# Patient Record
Sex: FEMALE | Race: WHITE | NOT HISPANIC OR LATINO | Employment: FULL TIME | URBAN - METROPOLITAN AREA
[De-identification: names, ages, dates, MRNs, and addresses within clinical notes are randomized per-mention and may not be internally consistent; named-entity substitution may affect disease eponyms.]

---

## 2017-04-08 ENCOUNTER — COMMUNICATION - HEALTHEAST (OUTPATIENT)
Dept: FAMILY MEDICINE | Facility: CLINIC | Age: 42
End: 2017-04-08

## 2017-04-08 DIAGNOSIS — G43.909 MIGRAINE: ICD-10-CM

## 2017-05-16 ENCOUNTER — ALLIED HEALTH/NURSE VISIT (OUTPATIENT)
Dept: LAB | Facility: CLINIC | Age: 42
End: 2017-05-16

## 2017-05-16 DIAGNOSIS — Z11.1 SCREENING EXAMINATION FOR PULMONARY TUBERCULOSIS: Primary | ICD-10-CM

## 2017-05-16 PROCEDURE — 36415 COLL VENOUS BLD VENIPUNCTURE: CPT | Performed by: FAMILY MEDICINE

## 2017-05-16 PROCEDURE — 86480 TB TEST CELL IMMUN MEASURE: CPT | Performed by: FAMILY MEDICINE

## 2017-05-16 NOTE — MR AVS SNAPSHOT
"              After Visit Summary   2017    Darlyn Parson    MRN: [id number]           Patient Information     Date Of Birth          1975        Visit Information        Provider Department      2017 8:45 AM  [facility]        Today's Diagnoses     Screening examination for pulmonary tuberculosis    -  1       Follow-ups after your visit        Who to contact     If you have questions or need follow up information about today's clinic visit or your schedule please contact [facility] directly at [phone].  Normal or non-critical lab and imaging results will be communicated to you by MyChart, letter or phone within 4 business days after the clinic has received the results. If you do not hear from us within 7 days, please contact the clinic through Tupalohart or phone. If you have a critical or abnormal lab result, we will notify you by phone as soon as possible.  Submit refill requests through HihoCoder or call your pharmacy and they will forward the refill request to us. Please allow 3 business days for your refill to be completed.          Additional Information About Your Visit        MyChart Information     HihoCoder lets you send messages to your doctor, view your test results, renew your prescriptions, schedule appointments and more. To sign up, go to [email or web address] . Click on \"Log in\" on the left side of the screen, which will take you to the Welcome page. Then click on \"Sign up Now\" on the right side of the page.     You will be asked to enter the access code listed below, as well as some personal information. Please follow the directions to create your username and password.     Your access code is: [email or web address]  Expires: 2017  9:02 AM     Your access code will  in 90 days. If you need help or a new code, please call your [facility] or [phone].        Care EveryWhere ID     This is your Care EveryWhere ID. This could be " used by other organizations to access your [facility] medical records  [id number]         Blood Pressure from Last 3 Encounters:   No data found for BP    Weight from Last 3 Encounters:   No data found for Wt              We Performed the Following     QuantiFERON TB Gold (In Tube) ([facility])        Primary Care Provider    Physician No Ref-Primary       No address on file        Thank you!     Thank you for choosing [facility]  for your care. Our goal is always to provide you with excellent care. Hearing back from our patients is one way we can continue to improve our services. Please take a few minutes to complete the written survey that you may receive in the mail after your visit with us. Thank you!             Your Updated Medication List - Protect others around you: Learn how to safely use, store and throw away your medicines at www.disposemymeds.org.      Notice  As of 5/16/2017  9:02 AM    You have not been prescribed any medications.

## 2017-05-18 LAB
M TB TUBERC IFN-G BLD QL: NEGATIVE
M TB TUBERC IFN-G/MITOGEN IGNF BLD: 0 IU/ML

## 2017-05-19 ENCOUNTER — TELEPHONE (OUTPATIENT)
Dept: FAMILY MEDICINE | Facility: CLINIC | Age: 42
End: 2017-05-19

## 2017-05-19 NOTE — TELEPHONE ENCOUNTER
Reason for Call:  Other call back    Detailed comments: patient is calling to get her TB results, they are done, but MD has not seen them, she needs to call her employer by 3pm TODAY to let them know if she can work on Monday    Phone Number Patient can be reached at: Home number on file [phone] (home)    Best Time: any    Can we leave a detailed message on this number? YES   Radha Ash  [facility]      Call taken on 5/19/2017 at 1:37 PM by Radha Ash

## 2017-05-19 NOTE — TELEPHONE ENCOUNTER
Spoke with patient and let her know that her TB result is negative.  Patient requesting a copy be mailed to her.    Yamila Sauceda RN

## 2017-08-01 ENCOUNTER — OFFICE VISIT - HEALTHEAST (OUTPATIENT)
Dept: FAMILY MEDICINE | Facility: CLINIC | Age: 42
End: 2017-08-01

## 2017-08-01 DIAGNOSIS — G43.009 MIGRAINE WITHOUT AURA: ICD-10-CM

## 2017-08-01 DIAGNOSIS — Z00.00 ROUTINE GENERAL MEDICAL EXAMINATION AT A HEALTH CARE FACILITY: ICD-10-CM

## 2017-08-01 ASSESSMENT — MIFFLIN-ST. JEOR: SCORE: 1267.77

## 2017-08-04 LAB
HPV INTERPRETATION - HISTORICAL: NORMAL
HPV INTERPRETER - HISTORICAL: NORMAL

## 2017-08-07 LAB
BKR LAB AP ABNORMAL BLEEDING: NO
BKR LAB AP BIRTH CONTROL/HORMONES: NORMAL
BKR LAB AP CERVICAL APPEARANCE: NORMAL
BKR LAB AP GYN ADEQUACY: NORMAL
BKR LAB AP GYN INTERPRETATION: NORMAL
BKR LAB AP GYN OTHER FINDINGS: NORMAL
BKR LAB AP HPV REFLEX: NORMAL
BKR LAB AP LMP: NORMAL
BKR LAB AP PATIENT STATUS: NO
BKR LAB AP PREVIOUS ABNORMAL: NO
BKR LAB AP PREVIOUS NORMAL: 2012
HIGH RISK?: NO
PATH REPORT.COMMENTS IMP SPEC: NORMAL
RESULT FLAG (HE HISTORICAL CONVERSION): NORMAL

## 2018-08-08 ENCOUNTER — COMMUNICATION - HEALTHEAST (OUTPATIENT)
Dept: FAMILY MEDICINE | Facility: CLINIC | Age: 43
End: 2018-08-08

## 2018-08-08 DIAGNOSIS — G43.009 MIGRAINE WITHOUT AURA: ICD-10-CM

## 2019-09-16 ENCOUNTER — OFFICE VISIT - HEALTHEAST (OUTPATIENT)
Dept: FAMILY MEDICINE | Facility: CLINIC | Age: 44
End: 2019-09-16

## 2019-09-16 DIAGNOSIS — G43.009 MIGRAINE WITHOUT AURA AND WITHOUT STATUS MIGRAINOSUS, NOT INTRACTABLE: ICD-10-CM

## 2019-09-16 DIAGNOSIS — Z00.00 ROUTINE GENERAL MEDICAL EXAMINATION AT A HEALTH CARE FACILITY: ICD-10-CM

## 2019-09-16 ASSESSMENT — MIFFLIN-ST. JEOR: SCORE: 1255.3

## 2020-04-30 NOTE — LETTER
[address]  [address]  [phone]      May 22, 2017      Darlyn Parson  [address] [address] [address]              Dear Ms. Parson,    Your blood test for tuberculosis is negative, or normal. There is been no exposure to tuberculosis. Please see enclosed copy.      Sincerely,    oCurtney Castro MD/ Anurag CMA    
0 = swallows foods/liquids without difficulty

## 2021-01-21 ENCOUNTER — OFFICE VISIT - HEALTHEAST (OUTPATIENT)
Dept: FAMILY MEDICINE | Facility: CLINIC | Age: 46
End: 2021-01-21

## 2021-01-21 ENCOUNTER — COMMUNICATION - HEALTHEAST (OUTPATIENT)
Dept: SCHEDULING | Facility: CLINIC | Age: 46
End: 2021-01-21

## 2021-01-21 DIAGNOSIS — G43.009 MIGRAINE WITHOUT AURA AND WITHOUT STATUS MIGRAINOSUS, NOT INTRACTABLE: ICD-10-CM

## 2021-01-21 RX ORDER — SUMATRIPTAN 100 MG/1
100 TABLET, FILM COATED ORAL
Qty: 30 TABLET | Refills: 11 | Status: SHIPPED | OUTPATIENT
Start: 2021-01-21 | End: 2022-01-25

## 2021-05-26 ENCOUNTER — RECORDS - HEALTHEAST (OUTPATIENT)
Dept: ADMINISTRATIVE | Facility: CLINIC | Age: 46
End: 2021-05-26

## 2021-05-31 VITALS — HEIGHT: 65 IN | BODY MASS INDEX: 22.66 KG/M2 | WEIGHT: 136 LBS

## 2021-06-01 NOTE — PROGRESS NOTES
Assessment/ Plan     1. Routine general medical examination at a health care facility  As far as healthcare maintenance, she will be due for Pap smear in 2023.  No concerns for family history of breast cancer so we will wait till 47 for mammogram.  She had blood work done at her work and she will see if she can get those records to me.  She has a family history of some type of muscular dystrophy in her father and uncles.  She is wondering if she should be concerned.  I discussed with her that some forms of muscular dystrophy are only male affected.  She will try to get more information and a better diagnosis so that we can make better recommendations.    2. Migraine without aura and without status migrainosus, not intractable  Refills given of the Imitrex.  She does not want to try anything preventative at this point.  - SUMAtriptan (IMITREX) 100 MG tablet; Take 1 tablet (100 mg total) by mouth every 2 (two) hours as needed for migraine.  Dispense: 30 tablet; Refill: 12      Subjective:     Darlyn Parson is a 44 y.o. female who presents for an annual exam.  Overall, she states she is been doing fairly well.  She still suffers from migraines that seem to be hormonal.  She had been on preventative medication in the past and does not want to take something daily.  She states Imitrex actually works really well when she does have a severe headache.  She is up-to-date on Pap smear.  She had a flu shot at work.  She also had blood work done at work with some biometric screening.  She will try to get those results to me so I can look them over.  She had a couple minor concerns one is some tingling in her fingers and toes when she takes a deep breath.  She also has noticed some proximal weakness when going upstairs and is concerned because her father and 2 uncles have some type of muscular dystrophy.  She is not sure the exact diagnosis.  I encouraged her to get more information as some forms of muscular dystrophy  only affect men.  If it some other type of disorder, where she could potentially be affected, we could refer her    Healthy Habits:   Regular Exercise: Yes  Sunscreen Use: No  Healthy Diet: Yes  Dental Visits Regularly: Yes  Seat Belt: Yes  Sexually active: Yes  Self Breast Exam Monthly:Yes and No  Colonoscopy: No   Lipid Profile: Yes  Glucose Screen: Yes  Prevention of Osteoporosis: yes - diet   Last Dexa: No        Immunization History   Administered Date(s) Administered     Hep B, Adult 2015     Hep B, historic 2014, 2014     Influenza, seasonal,quad inj 6-35 mos 10/01/2012     PPD Test 2015, 2016     Tdap 2012     Immunization status: up to date and documented.     Gynecologic History  Patient's last menstrual period was 2019.  Contraception: none  Last Pap: . Results were: normal      OB History   No data available       Current Outpatient Medications   Medication Sig Dispense Refill     SUMAtriptan (IMITREX) 100 MG tablet Take 1 tablet (100 mg total) by mouth every 2 (two) hours as needed for migraine. 30 tablet 12     No current facility-administered medications for this visit.      Past Medical History:   Diagnosis Date     Herpes Zoster (Shingles)     Created by [facility]notation: Oct  3 2012  5:49PM - Sridevi Dunn: Vulva Oct.  2012      Past Surgical History:   Procedure Laterality Date     IL  DELIVERY ONLY      Description:  Section;  Recorded: 2014;  Comments: x3     IL LIGATE FALLOPIAN TUBE      Description: Tubal Ligation;  Recorded: 2014;     Patient has no known allergies.  No family history on file.  Social History     Socioeconomic History     Marital status:      Spouse name: Not on file     Number of children: Not on file     Years of education: Not on file     Highest education level: Not on file   Occupational History     Not on file   Social Needs     Financial resource strain: Not on file  "    Food insecurity:     Worry: Not on file     Inability: Not on file     Transportation needs:     Medical: Not on file     Non-medical: Not on file   Tobacco Use     Smoking status: Never Smoker     Smokeless tobacco: Never Used   Substance and Sexual Activity     Alcohol use: Not on file     Drug use: Not on file     Sexual activity: Not on file   Lifestyle     Physical activity:     Days per week: Not on file     Minutes per session: Not on file     Stress: Not on file   Relationships     Social connections:     Talks on phone: Not on file     Gets together: Not on file     Attends Jain service: Not on file     Active member of club or organization: Not on file     Attends meetings of clubs or organizations: Not on file     Relationship status: Not on file     Intimate partner violence:     Fear of current or ex partner: Not on file     Emotionally abused: Not on file     Physically abused: Not on file     Forced sexual activity: Not on file   Other Topics Concern     Not on file   Social History Narrative     Not on file       Review of Systems  General:  Denies problems  Eyes:  Denies problems  Ears/Nose/Throat:  Denies problems  Cardiovascular:  Denies problems  Respiratory:  Denies problems  Gastrointestinal:  Denies problems  Genitourinary:  Denies problems  Musculoskeletal:  Denies problems  Skin:  Denies problems  Neurologic:  Denies problems  Psychiatric:  Denies problems  Endocrine:  Denies problems  Heme/Lymphatic:  Denies problems  Allergic/Immunologic:  Denies problems    Objective:      Vitals:    09/16/19 1437   BP: 100/62   Pulse: 88   Resp: 16   Temp: 98.3  F (36.8  C)   Weight: 135 lb (61.2 kg)   Height: 5' 4.5\" (1.638 m)       Physical Exam:  General Appearance: Alert, cooperative, no distress, appears stated age  Head: Normocephalic, without obvious abnormality, atraumatic  Eyes: PERRL, conjunctiva/corneas clear, EOM's intact  Ears: Normal TM's and external ear canals, both ears  Nose: " Nares normal, septum midline,mucosa normal, no drainage  Throat: Lips, mucosa, and tongue normal; teeth and gums normal  Neck: Supple, symmetrical, trachea midline, no adenopathy; thyroid: not enlarged, symmetric, no tenderness/mass/nodules; no carotid bruit  Back: Symmetric, no curvature, ROM normal, no CVA tenderness  Lungs: Clear to auscultation bilaterally, respirations unlabored  Breasts: No breast masses, tenderness, asymmetry, or nipple discharge  Heart: Regular rate and rhythm, S1 and S2 normal, no murmur, rub, or gallop, Abdomen: Soft, non-tender, bowel sounds active all four quadrants,  no masses, no organomegaly  Extremities: Extremities normal, atraumatic, no cyanosis or edema  Skin: Skin color, texture, turgor normal, no rashes or lesions  Lymph nodes: Cervical, supraclavicular, and axillary nodes normal  Neurologic: Normal        Results for orders placed or performed in visit on 08/01/17   Gynecologic Cytology (PAP Smear)   Result Value Ref Range    Case Report       Gynecologic Cytology Report                       Case: [id number]                                   Authorizing Provider:  Madison Black MD           Collected:           08/01/2017 1604              Ordering Location:     [facility] and   Received:            08/01/2017 1604                                     Obstetrics                                                                   First Screen:          DAIANA Weaver                                                                            (ASCP)                                                                       Specimen:    SUREPATH PAP, SCREENING, Endocervical/cervical                                             Interpretation       Negative for squamous intraepithelial lesion or malignancy    Result Flag Normal Normal    Other Findings       Fungal organisms morphologically consistent with Candida spp    Specimen Adequacy       Satisfactory for  evaluation, endocervical/transformation zone component present    HPV Reflex? Yes regardless of result     HIGH RISK No     LMP/Menopause Date 1 week     Abnormal Bleeding No     Pt Status no     Birth Control/Hormones None     Previous Normal/Date 2012     Prev Abn Date/Dx no     Cervical Appearance normal    HPV Cascade (PCR)   Result Value Ref Range    Interpretation No HPV Type(s) Detected No HPV Type(s) Detected, No High Risk HPV Type(s) Detected, DNA Quantity Not Sufficient     Dinh Hernández MD, [facility]        Madison Black MD    This note has been dictated using voice recognition software. Any grammatical or context distortions are unintentional and inherent to the software

## 2021-06-03 VITALS
HEIGHT: 65 IN | HEART RATE: 88 BPM | RESPIRATION RATE: 16 BRPM | BODY MASS INDEX: 22.49 KG/M2 | TEMPERATURE: 98.3 F | DIASTOLIC BLOOD PRESSURE: 62 MMHG | WEIGHT: 135 LBS | SYSTOLIC BLOOD PRESSURE: 100 MMHG

## 2021-06-12 NOTE — PROGRESS NOTES
Assessment/ Plan     1. Routine general medical examination at a health care facility  As far as healthcare maintenance, she is due for a Pap smear today.  She is otherwise healthy and up-to-date on immunizations.  - Gynecologic Cytology (PAP Smear)    2. Migraine without aura  Patient just needs refills of her Imitrex.  She is usually using this several times per month but it works very well.  She does not want to take anything preventative at this time.  - SUMAtriptan (IMITREX) 100 MG tablet; Take 1 tablet (100 mg total) by mouth every 2 (two) hours as needed for migraine.  Dispense: 9 tablet; Refill: 12      Subjective:       Darlyn Parson is a 42 y.o. female who presents for refills of her migraine medication.  Review of the chart shows that she has not had a Pap now in 5 years.  She is willing to do that today.  She states she is otherwise healthy other than the migraines.  They tend to be hormonal.  She usually has 2-3 per month.  The Imitrex works really well if she starts it right at the beginning of her migraine.  She used to be on a preventative, but does not really want to do that anymore as they are not as frequent as they used to be.  Social history: She has 3 teenage children.  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  She is a non-smoker.  She really is not finding any time to exercise at this time.  Past medical history significant for 2 C-sections and a uterine rupture.    The following portions of the patient's history were reviewed and updated as appropriate: allergies, current medications, past family history, past medical history, past social history, past surgical history and problem list.    Review of Systems   A 12 point comprehensive review of systems was negative except as noted.      Current Outpatient Prescriptions   Medication Sig Dispense Refill     SUMAtriptan (IMITREX) 100 MG tablet Take 1 tablet (100 mg total) by  "mouth every 2 (two) hours as needed for migraine. 9 tablet 12     No current facility-administered medications for this visit.        Objective:      /60  Pulse 68  Temp 97.8  F (36.6  C) (Oral)   Resp 16  Ht 5' 5\" (1.651 m)  Wt 136 lb (61.7 kg)  BMI 22.63 kg/m2      General appearance: alert, appears stated age and cooperative  Head: Normocephalic, without obvious abnormality, atraumatic  Eyes: conjunctivae/corneas clear. PERRL, EOM's intact. Fundi benign.  Ears: normal TM's and external ear canals both ears  Nose: Nares normal. Septum midline. Mucosa normal. No drainage or sinus tenderness.  Throat: lips, mucosa, and tongue normal; teeth and gums normal  Neck: no adenopathy, no carotid bruit, no JVD, supple, symmetrical, trachea midline and thyroid not enlarged, symmetric, no tenderness/mass/nodules  Back: symmetric, no curvature. ROM normal. No CVA tenderness.  Lungs: clear to auscultation bilaterally  Heart: regular rate and rhythm, S1, S2 normal, no murmur, click, rub or gallop   exam: Normal external genitalia, cervix is well visualized and Pap smear is obtained.  Normal bimanual exam.  Abdomen: soft, non-tender; bowel sounds normal; no masses,  no organomegaly  Extremities: extremities normal, atraumatic, no cyanosis or edema  Pulses: 2+ and symmetric  Skin: Skin color, texture, turgor normal. No rashes or lesions  Lymph nodes: Cervical, supraclavicular, and axillary nodes normal.  Neurologic: Alert and oriented X 3, normal strength and tone. Normal symmetric reflexes. Normal coordination and gait         No results found for this or any previous visit (from the past 168 hour(s)).       This note has been dictated using voice recognition software. Any grammatical or context distortions are unintentional and inherent to the software  "

## 2021-06-14 NOTE — TELEPHONE ENCOUNTER
"Migraine headache past 2 days/ rates as a \"3-4/10\" only has 2 Imitrix left and is concerned/ has had migraines for 15 years/ has to work tonight/so goes to sleep early afternoon/ prefers a phone call over an MD visit/ sent to scheduling/with in 4 hours as per graciela/   VINCE Blakely    Reason for Disposition    Patient wants to be seen    Additional Information    Negative: Difficult to awaken or acting confused (e.g., disoriented, slurred speech)    Negative: Weakness of the face, arm or leg on one side of the body and new onset    Negative: Numbness of the face, arm or leg on one side of the body and new onset    Negative: Loss of speech or garbled speech and new onset    Negative: Passed out (i.e., fainted, collapsed and was not responding)    Negative: Sounds like a life-threatening emergency to the triager    Negative: Followed a head injury within last 3 days    Negative: Traumatic Brain Injury (TBI) is suspected    Negative: Sinus pain of forehead and yellow or green nasal discharge    Negative: Pregnant    Negative: Unable to walk without falling    Negative: Stiff neck (can't touch chin to chest)    Negative: Possibility of carbon monoxide exposure    Negative: SEVERE headache, states 'worst headache' of life    Negative: SEVERE headache, sudden onset (i.e., reaching maximum intensity within 30 seconds)    Negative: Severe pain in one eye    Negative: Loss of vision or double vision    Negative: Patient sounds very sick or weak to the triager    Negative: Fever > 103 F (39.4 C)    Negative: Fever > 100.0 F (37.8 C) and has diabetes mellitus or a weak immune system (e.g., HIV positive, cancer chemotherapy, organ transplant, splenectomy, chronic steroids)    Negative: SEVERE headache (e.g., excruciating) and has had severe headaches before    Negative: SEVERE headache and not relieved by pain meds    Negative: SEVERE headache and vomiting    Negative: SEVERE headache and fever    Negative: New headache and " weak immune system (e.g., HIV positive, cancer chemotherapy, chronic steroid treatment)    Negative: Fever present > 3 days (72 hours)    Protocols used: HEADACHE-A-OH

## 2021-06-14 NOTE — PROGRESS NOTES
Darlyn Parson is a 45 y.o. female who is being evaluated via a billable video visit.      How would you like to obtain your AVS? MyChart.  If dropped from the video visit, the video invitation should be resent by: Text to cell phone: [phone]  Will anyone else be joining your video visit? No      Video Start Time: 10:04      Assessment/ Plan     1. Migraine without aura and without status migrainosus, not intractable    She has generally been stable with her migraine headaches  She is aware of triggers which include hormonal fluctuations with her menstrual cycle  She has approximately 4-5 headaches per month  She will continue sumatriptan as needed which has been effective.  She finds that 50 mg is often helpful  In the remote past she took a preventative medicine but does not believe that she needs a preventative medicine currently  She will continue current therapy and follow-up with Dr. Black as needed  - SUMAtriptan (IMITREX) 100 MG tablet; Take 1 tablet (100 mg total) by mouth every 2 (two) hours as needed for migraine.  Dispense: 30 tablet; Refill: 11      Subjective:       Darlyn Parson is a 45 y.o. female who presents for a video visit during the COVID-19 pandemic.  Her primary physician is Dr. Black.  Her primary concern is getting a refill of her migraine medication.  She has a longstanding history of migraine headaches.  She typically has unilateral headaches on the right side of her head though these can vary.  She sometimes has discomfort radiating from the shoulder neck.  She has associated nausea as well as light and sound sensitivity.  She will take Naprosyn when she gets a headache.  She typically will take sumatriptan 50 mg and may need to take an additional dose at times.  She estimates that she gets 4-5 headaches per month.  Headache typically in the last two days.  Some are more severe than others.  She did follow-up with neurology in the remote past and was treated with a  preventative medication.  She believes she took Depakote but that did not seem to be effective.  She like to continue with the current regimen.  She has no other headaches seem to be associated with her menstrual cycle as well.  These have not been severe or increasing in frequency.    The following portions of the patient's history were reviewed and updated as appropriate: allergies, current medications, past family history, past medical history, past social history, past surgical history and problem list. Medications have been reconciled    Review of Systems   A 12 point comprehensive review of systems was negative except as noted.      Current Outpatient Medications   Medication Sig Dispense Refill     SUMAtriptan (IMITREX) 100 MG tablet Take 1 tablet (100 mg total) by mouth every 2 (two) hours as needed for migraine. 30 tablet 11     No current facility-administered medications for this visit.        Objective:      There were no vitals taken for this visit.    GENERAL: Healthy, alert and no distress  EYES: Eyes grossly normal to inspection. No discharge or erythema, or obvious scleral/conjunctival abnormalities.  RESP: No audible wheeze, cough, or visible cyanosis.  No visible retractions or increased work of breathing.    NEURO: Cranial nerves grossly intact. Mentation and speech appropriate for age.  PSYCH: Mentation appears normal, affect normal/bright, judgement and insight intact, normal speech and appearance well-groomed           No results found for this or any previous visit (from the past 168 hour(s)).       This note has been dictated using voice recognition software. Any grammatical or context distortions are unintentional and inherent to the software    Handy Velázquez MD      Video-Visit Details    Type of service:  Video Visit    Video End Time (time video stopped): 10:12  Originating Location (pt. Location): Home    Distant Location (provider location):  [facility]  [facility]     Platform used for Video Visit: Bony

## 2021-08-14 ENCOUNTER — HEALTH MAINTENANCE LETTER (OUTPATIENT)
Age: 46
End: 2021-08-14

## 2021-10-09 ENCOUNTER — HEALTH MAINTENANCE LETTER (OUTPATIENT)
Age: 46
End: 2021-10-09

## 2022-01-25 ENCOUNTER — OFFICE VISIT (OUTPATIENT)
Dept: FAMILY MEDICINE | Facility: CLINIC | Age: 47
End: 2022-01-25
Payer: COMMERCIAL

## 2022-01-25 VITALS
RESPIRATION RATE: 16 BRPM | HEIGHT: 65 IN | HEART RATE: 87 BPM | TEMPERATURE: 98.5 F | SYSTOLIC BLOOD PRESSURE: 128 MMHG | BODY MASS INDEX: 23.99 KG/M2 | OXYGEN SATURATION: 99 % | WEIGHT: 144 LBS | DIASTOLIC BLOOD PRESSURE: 88 MMHG

## 2022-01-25 DIAGNOSIS — G43.009 MIGRAINE WITHOUT AURA AND WITHOUT STATUS MIGRAINOSUS, NOT INTRACTABLE: ICD-10-CM

## 2022-01-25 DIAGNOSIS — Z01.818 PREOP GENERAL PHYSICAL EXAM: Primary | ICD-10-CM

## 2022-01-25 DIAGNOSIS — H26.9 CATARACT OF BOTH EYES, UNSPECIFIED CATARACT TYPE: ICD-10-CM

## 2022-01-25 PROCEDURE — 99214 OFFICE O/P EST MOD 30 MIN: CPT | Performed by: NURSE PRACTITIONER

## 2022-01-25 RX ORDER — SUMATRIPTAN 100 MG/1
100 TABLET, FILM COATED ORAL
Qty: 30 TABLET | Refills: 11 | Status: SHIPPED | OUTPATIENT
Start: 2022-01-25 | End: 2023-04-19

## 2022-01-25 ASSESSMENT — MIFFLIN-ST. JEOR: SCORE: 1296.12

## 2022-01-25 NOTE — PROGRESS NOTES
[facility]  [address]  [facility] [address]  Phone: [phone]  Primary Provider: Madison Black  Pre-op Performing Provider: NAIDA LU      PREOPERATIVE EVALUATION:  Today's date: 1/25/2022    Darlyn Parson is a 46 year old female who presents for a preoperative evaluation.    Surgical Information:  Surgery/Procedure: cataracts   Surgery Location: [facility]   Surgeon: Dr. Bernstein  Surgery Date: 2/2/22  Time of Surgery: tbd  Where patient plans to recover: At home with family  Fax number for surgical facility: [phone]     Type of Anesthesia Anticipated: MAC with local    Assessment & Plan     The proposed surgical procedure is considered LOW risk.    Preop general physical exam      Cataract of both eyes, unspecified cataract type      Migraine without aura and without status migrainosus, not intractable  Controlled on current PRN treatment  - SUMAtriptan (IMITREX) 100 MG tablet; Take 1 tablet (100 mg) by mouth every 2 hours as needed for migraine Max 2 per day      Medication Instructions:  Patient is on no chronic medications    RECOMMENDATION:  APPROVAL GIVEN to proceed with proposed procedure, without further diagnostic evaluation.                      Subjective     HPI related to upcoming procedure: Bilateral cataracts    Preop Questions 1/25/2022   1. Have you ever had a heart attack or stroke? No   2. Have you ever had surgery on your heart or blood vessels, such as a stent placement, a coronary artery bypass, or surgery on an artery in your head, neck, heart, or legs? No   3. Do you have chest pain with activity? No   4. Do you have a history of  heart failure? No   5. Do you currently have a cold, bronchitis or symptoms of other infection? No   6. Do you have a cough, shortness of breath, or wheezing? No   7. Do you or anyone in your family have previous history of blood clots? YES - father   8. Do you or does anyone in your family have a  serious bleeding problem such as prolonged bleeding following surgeries or cuts? No   9. Have you ever had problems with anemia or been told to take iron pills? YES - with pregnancy    10. Have you had any abnormal blood loss such as black, tarry or bloody stools, or abnormal vaginal bleeding? No   11. Have you ever had a blood transfusion? YES - after , uterine rupture    11a. Have you ever had a transfusion reaction? No   12. Are you willing to have a blood transfusion if it is medically needed before, during, or after your surgery? Yes   13. Have you or any of your relatives ever had problems with anesthesia? No   14. Do you have sleep apnea, excessive snoring or daytime drowsiness? No   15. Do you have any artifical heart valves or other implanted medical devices like a pacemaker, defibrillator, or continuous glucose monitor? No   16. Do you have artificial joints? No   17. Are you allergic to latex? No   18. Is there any chance that you may be pregnant? No       Health Care Directive:  Patient does not have a Health Care Directive or Living Will: Discussed advance care planning with patient; however, patient declined at this time.    Preoperative Review of :   reviewed - no record of controlled substances prescribed.    PDMP Review       Value Time User    State PDMP site checked  Yes 2022  9:31 AM Bridgette Mora APRN CNP            Status of Chronic Conditions:  See problem list for active medical problems.  Problems all longstanding and stable, except as noted/documented.  See ROS for pertinent symptoms related to these conditions.      Review of Systems  CONSTITUTIONAL: NEGATIVE for fever, chills, change in weight  INTEGUMENTARY/SKIN: NEGATIVE for worrisome rashes, moles or lesions  EYES: POSITIVE for Hx cataracts  ENT/MOUTH: NEGATIVE for ear, mouth and throat problems  RESP: NEGATIVE for significant cough or SOB  CV: NEGATIVE for chest pain, palpitations or peripheral  "edema  MUSCULOSKELETAL: NEGATIVE for significant arthralgias or myalgia    Patient Active Problem List    Diagnosis Date Noted     Common Migraine (Without Aura)      Priority: Medium     Created by Conversion  Replacement Utility updated for latest IMO load          Past Medical History:   Diagnosis Date     Herpes zoster without mention of complication     Created by Conversion [facility] Annotation: Oct  3 2012  5:49PM - Lakeisha Dunnbeth: [place] Oct.  2012      Past Surgical History:   Procedure Laterality Date     ZZC  DELIVERY ONLY      Description:  Section;  Recorded: 2014;  Comments: x3     ZZC LIGATE FALLOPIAN TUBE      Description: Tubal Ligation;  Recorded: 2014;     Current Outpatient Medications   Medication Sig Dispense Refill     SUMAtriptan (IMITREX) 100 MG tablet [SUMATRIPTAN (IMITREX) 100 MG TABLET] Take 1 tablet (100 mg total) by mouth every 2 (two) hours as needed for migraine. 30 tablet 11       No Known Allergies     Social History     Tobacco Use     Smoking status: Never Smoker     Smokeless tobacco: Never Used   Substance Use Topics     Alcohol use: Never       History   Drug Use Unknown         Objective     /88   Pulse 87   Temp 98.5  F (36.9  C) (Tympanic)   Resp 16   Ht 1.638 m (5' 4.5\")   Wt 65.3 kg (144 lb)   SpO2 99%   BMI 24.34 kg/m      Physical Exam  GENERAL APPEARANCE: healthy, alert and no distress  HENT: oral mucous membranes moist and oropharynx clear  RESP: lungs clear to auscultation - no rales, rhonchi or wheezes  CV: regular rate and rhythm, normal S1 S2, no S3 or S4 and no murmur, click or rub   ABDOMEN: soft, nontender, no HSM or masses and bowel sounds normal  MS: extremities normal- no gross deformities noted  NEURO: Normal strength and tone, sensory exam grossly normal, mentation intact and speech normal    No results for input(s): HGB, PLT, INR, NA, POTASSIUM, CR, A1C in the last 11671 hours.     Diagnostics:  No labs were " ordered during this visit.   No EKG required for low risk surgery (cataract, skin procedure, breast biopsy, etc).    Revised Cardiac Risk Index (RCRI):  The patient has the following serious cardiovascular risks for perioperative complications:   - No serious cardiac risks = 0 points     RCRI Interpretation: 0 points: Class I (very low risk - 0.4% complication rate)           Signed Electronically by: LB Back CNP  Copy of this evaluation report is provided to requesting physician.

## 2022-01-25 NOTE — PATIENT INSTRUCTIONS
Preparing for Your Surgery  Getting started  A nurse will call you to review your health history and instructions. They will give you an arrival time based on your scheduled surgery time. Please be ready to share:    Your doctor's clinic name and phone number    Your medical, surgical and anesthesia history    A list of allergies and sensitivities    A list of medicines, including herbal treatments and over-the-counter drugs    Whether the patient has a legal guardian (ask how to send us the papers in advance)  Please tell us if you're pregnant--or if there's any chance you might be pregnant. Some surgeries may injure a fetus (unborn baby), so they require a pregnancy test. Surgeries that are safe for a fetus don't always need a test, and you can choose whether to have one.   If you have a child who's having surgery, please ask for a copy of Preparing for Your Child's Surgery.    Preparing for surgery    Within 30 days of surgery: Have a pre-op exam (sometimes called an H&P, or History and Physical). This can be done at a clinic or pre-operative center.  ? If you're having a , you may not need this exam. Talk to your care team.    At your pre-op exam, talk to your care team about all medicines you take. If you need to stop any medicines before surgery, ask when to start taking them again.  ? We do this for your safety. Many medicines can make you bleed too much during surgery. Some change how well surgery (anesthesia) drugs work.    Call your insurance company to let them know you're having surgery. (If you don't have insurance, call [phone].)    Call your clinic if there's any change in your health. This includes signs of a cold or flu (sore throat, runny nose, cough, rash, fever). It also includes a scrape or scratch near the surgery site.    If you have questions on the day of surgery, call your hospital or surgery center.  COVID testing  You may need to be tested for COVID-19 before having  surgery. If so, your surgical team will give you instructions for scheduling this test, separate from your preoperative history and physical.  Eating and drinking guidelines  For your safety: Unless your surgeon tells you otherwise, follow the guidelines below.    Eat and drink as usual until 8 hours before surgery. After that, no food or milk.    Drink clear liquids until 2 hours before surgery. These are liquids you can see through, like water, Gatorade and Propel Water. You may also have black coffee and tea (no cream or milk).    Nothing by mouth within 2 hours of surgery. This includes gum, candy and breath mints.    If you drink alcohol: Stop drinking it the night before surgery.    If your care team tells you to take medicine on the morning of surgery, it's okay to take it with a sip of water.  Preventing infection    Shower or bathe the night before and morning of your surgery. Follow the instructions your clinic gave you. (If no instructions, use regular soap.)    Don't shave or clip hair near your surgery site. We'll remove the hair if needed.    Don't smoke or vape the morning of surgery. You may chew nicotine gum up to 2 hours before surgery. A nicotine patch is okay.  ? Note: Some surgeries require you to completely quit smoking and nicotine. Check with your surgeon.    Your care team will make every effort to keep you safe from infection. We will:  ? Clean our hands often with soap and water (or an alcohol-based hand rub).  ? Clean the skin at your surgery site with a special soap that kills germs.  ? Give you a special gown to keep you warm. (Cold raises the risk of infection.)  ? Wear special hair covers, masks, gowns and gloves during surgery.  ? Give antibiotic medicine, if prescribed. Not all surgeries need antibiotics.  What to bring on the day of surgery    Photo ID and insurance card    Copy of your health care directive, if you have one    Glasses and hearing aides (bring cases)  ? You can't  wear contacts during surgery    Inhaler and eye drops, if you use them (tell us about these when you arrive)    CPAP machine or breathing device, if you use them    A few personal items, if spending the night    If you have . . .  ? A pacemaker, ICD (cardiac defibrillator) or other implant: Bring the ID card.  ? An implanted stimulator: Bring the remote control.  ? A legal guardian: Bring a copy of the certified (court-stamped) guardianship papers.  Please remove any jewelry, including body piercings. Leave jewelry and other valuables at home.  If you're going home the day of surgery    You must have a responsible adult drive you home. They should stay with you overnight as well.    If you don't have someone to stay with you, and you aren't safe to go home alone, we may keep you overnight. Insurance often won't pay for this.  After surgery  If it's hard to control your pain or you need more pain medicine, please call your surgeon's office.  Questions?   If you have any questions for your care team, list them here: _________________________________________________________________________________________________________________________________________________________________________ ____________________________________ ____________________________________ ____________________________________  For informational purposes only. Not to replace the advice of your health care provider. Copyright   2003, 2019 [facility]. All rights reserved. Clinically reviewed by Ashley Smith MD. [employer] Local [id number] - REV 07/21.

## 2022-05-21 ENCOUNTER — HEALTH MAINTENANCE LETTER (OUTPATIENT)
Age: 47
End: 2022-05-21

## 2022-08-23 ENCOUNTER — TELEPHONE (OUTPATIENT)
Dept: FAMILY MEDICINE | Facility: CLINIC | Age: 47
End: 2022-08-23

## 2022-08-23 NOTE — TELEPHONE ENCOUNTER
Patient Quality Outreach    Patient is due for the following:   Colon Cancer Screening  Breast Cancer Screening - Mammogram  Cervical Cancer Screening - PAP Needed  Physical Preventive Adult Physical    Next Steps:   Schedule a Adult Preventative    Type of outreach:    Sent letter.    Next Steps:  Reach out within 90 days via Letter.    Max number of attempts reached: No. Will try again in 90 days if patient still on fail list.    Questions for provider review:    None     Christel Joy, [facility]

## 2022-08-23 NOTE — LETTER
[facility]  [address] [facility] [address]  Phone: [phone]  Fax: [phone]      August 23, 2022      Darlyn Parson  [address] [facility] [address]      Your healthcare team cares about your health. To provide you with the best care,   we have reviewed your chart and based on our findings, we see that you are due to:     - BREAST CANCER SCREENING:  Schedule Annual Mammogram. Breast [place] scheduling number - [phone] or schedule in MyChart (self referall)  - CERVICAL CANCER SCREENING: Schedule a Cervical Cancer Screening, with Pap and wellness exam.   - COLON CANCER SCREENING:  Call or mychart the clinic to schedule your colonoscopy or schedule/ your FIT Test, or Cologuard test    If you have already completed these items, please contact the clinic via phone or   [facility] so your care team can review and update your records. Thank you for   choosing [facility] for your healthcare needs. For any questions,   concerns, or to schedule an appointment please contact the clinic.       Healthy Regards,      Your [facility] Care Team

## 2022-09-17 ENCOUNTER — HEALTH MAINTENANCE LETTER (OUTPATIENT)
Age: 47
End: 2022-09-17

## 2023-04-19 ENCOUNTER — HOSPITAL ENCOUNTER (OUTPATIENT)
Dept: CARDIOLOGY | Facility: CLINIC | Age: 48
Discharge: HOME | End: 2023-04-19
Attending: NURSE PRACTITIONER | Admitting: NURSE PRACTITIONER
Payer: COMMERCIAL

## 2023-04-19 ENCOUNTER — OFFICE VISIT (OUTPATIENT)
Dept: FAMILY MEDICINE | Facility: CLINIC | Age: 48
End: 2023-04-19
Payer: COMMERCIAL

## 2023-04-19 ENCOUNTER — NURSE TRIAGE (OUTPATIENT)
Dept: NURSING | Facility: CLINIC | Age: 48
End: 2023-04-19
Payer: COMMERCIAL

## 2023-04-19 VITALS
RESPIRATION RATE: 16 BRPM | WEIGHT: 147 LBS | BODY MASS INDEX: 24.84 KG/M2 | DIASTOLIC BLOOD PRESSURE: 72 MMHG | TEMPERATURE: 97.9 F | HEART RATE: 92 BPM | OXYGEN SATURATION: 100 % | SYSTOLIC BLOOD PRESSURE: 130 MMHG

## 2023-04-19 DIAGNOSIS — R00.2 PALPITATIONS: Primary | ICD-10-CM

## 2023-04-19 DIAGNOSIS — G43.009 MIGRAINE WITHOUT AURA AND WITHOUT STATUS MIGRAINOSUS, NOT INTRACTABLE: ICD-10-CM

## 2023-04-19 DIAGNOSIS — R00.2 PALPITATIONS: ICD-10-CM

## 2023-04-19 DIAGNOSIS — K21.9 GASTROESOPHAGEAL REFLUX DISEASE WITHOUT ESOPHAGITIS: ICD-10-CM

## 2023-04-19 LAB
ANION GAP SERPL CALCULATED.3IONS-SCNC: 9 MMOL/L (ref 7–15)
BUN SERPL-MCNC: 12.1 MG/DL (ref 6–20)
CALCIUM SERPL-MCNC: 9.2 MG/DL (ref 8.6–10)
CHLORIDE SERPL-SCNC: 107 MMOL/L (ref 98–107)
CREAT SERPL-MCNC: 0.52 MG/DL (ref 0.51–0.95)
DEPRECATED HCO3 PLAS-SCNC: 25 MMOL/L (ref 22–29)
ERYTHROCYTE [DISTWIDTH] IN BLOOD BY AUTOMATED COUNT: 14.9 % (ref 10–15)
GFR SERPL CREATININE-BSD FRML MDRD: >90 ML/MIN/1.73M2
GLUCOSE SERPL-MCNC: 92 MG/DL (ref 70–99)
HCT VFR BLD AUTO: 37.4 % (ref 35–47)
HGB BLD-MCNC: 11.4 G/DL (ref 11.7–15.7)
MCH RBC QN AUTO: 27.6 PG (ref 26.5–33)
MCHC RBC AUTO-ENTMCNC: 30.5 G/DL (ref 31.5–36.5)
MCV RBC AUTO: 91 FL (ref 78–100)
PLATELET # BLD AUTO: 305 10E3/UL (ref 150–450)
POTASSIUM SERPL-SCNC: 4.4 MMOL/L (ref 3.4–5.3)
RBC # BLD AUTO: 4.13 10E6/UL (ref 3.8–5.2)
SODIUM SERPL-SCNC: 141 MMOL/L (ref 136–145)
TSH SERPL DL<=0.005 MIU/L-ACNC: 0.42 UIU/ML (ref 0.3–4.2)
WBC # BLD AUTO: 6.6 10E3/UL (ref 4–11)

## 2023-04-19 PROCEDURE — 80048 BASIC METABOLIC PNL TOTAL CA: CPT | Performed by: NURSE PRACTITIONER

## 2023-04-19 PROCEDURE — 36415 COLL VENOUS BLD VENIPUNCTURE: CPT | Performed by: NURSE PRACTITIONER

## 2023-04-19 PROCEDURE — 93000 ELECTROCARDIOGRAM COMPLETE: CPT | Performed by: NURSE PRACTITIONER

## 2023-04-19 PROCEDURE — 99214 OFFICE O/P EST MOD 30 MIN: CPT | Performed by: NURSE PRACTITIONER

## 2023-04-19 PROCEDURE — 84443 ASSAY THYROID STIM HORMONE: CPT | Performed by: NURSE PRACTITIONER

## 2023-04-19 PROCEDURE — 93246 EXT ECG>7D<15D RECORDING: CPT

## 2023-04-19 PROCEDURE — 85027 COMPLETE CBC AUTOMATED: CPT | Performed by: NURSE PRACTITIONER

## 2023-04-19 RX ORDER — SUMATRIPTAN 100 MG/1
100 TABLET, FILM COATED ORAL
Qty: 30 TABLET | Refills: 11 | Status: SHIPPED | OUTPATIENT
Start: 2023-04-19 | End: 2024-08-15

## 2023-04-19 ASSESSMENT — PAIN SCALES - GENERAL: PAINLEVEL: NO PAIN (1)

## 2023-04-19 NOTE — PROGRESS NOTES
Assessment & Plan     Palpitations  Ongoing for about a month. No chest pain. No shortness of breath, dyspnea on exertion. EKG today is normal. Recommend labs, Zio Patch for further evaluation.  - EKG 12-lead complete w/read - Clinics  - Adult Leadless EKG Monitor 8 to 14 Days; Future  - CBC with platelets; Future  - Basic metabolic panel  (Ca, Cl, CO2, Creat, Gluc, K, Na, BUN); Future  - TSH with free T4 reflex; Future      Gastroesophageal reflux disease without esophagitis  Has had a new onset of reflux symptoms. Discussed possible triggers, symptomatic management. Will let me know if needing more than episodic treatment.    Migraine without aura and without status migrainosus, not intractable  History of migraines, tends to occur around period. No change to symptoms. Imitrex is effective. Needs refill.   - SUMAtriptan (IMITREX) 100 MG tablet; Take 1 tablet (100 mg) by mouth every 2 hours as needed for migraine Max 2 per day      Patient Instructions   Please contact the cardiac testing department at [phone] to schedule heart monitor.  Labs today, I will let you know what they show.  Can take over the counter pepcid for heartburn symptoms as needed or twice a day if things are worsening.        LB Connors CNP  [facility]    Yoel Santizo is a 47 year old, presenting for the following health issues:  Heart Problem        4/19/2023     1:15 PM   Additional Questions   Roomed by Stacey MAGUIRE   Accompanied by self     History of Present Illness       Reason for visit:  Heart symptoms  Symptom onset:  More than a month    She eats 2-3 servings of fruits and vegetables daily.She consumes 0 sweetened beverage(s) daily.She exercises with enough effort to increase her heart rate 30 to 60 minutes per day.  She exercises with enough effort to increase her heart rate 5 days per week.   She is taking medications regularly.       Above HPI reviewed. Additionally, heart pounding and  fluttering sensation daily for the past month.  Does frequently occur after eating but not always.  No chest pain, shortness of breath, exertional dyspnea.  Denies excessive caffeine use.  Non-smoker.  Denies weight changes, hair changes, skin changes, temperature intolerance.    Notes had her first episode of heartburn however about 1 week ago that lasted most of the night.  She did try to take Tums but it was not very effective.  Occurred again later in the week, but only lasted about 20 minutes.  Again denies excessive caffeine use, smoking, eating much greasy or spicy food.    Review of Systems   Constitutional, HEENT, cardiovascular, pulmonary, gi and gu systems are negative, except as otherwise noted.      Objective    /72   Pulse 92   Temp 97.9  F (36.6  C) (Tympanic)   Resp 16   Wt 66.7 kg (147 lb)   SpO2 100%   BMI 24.84 kg/m    Body mass index is 24.84 kg/m .  Physical Exam  Vitals and nursing note reviewed.   Constitutional:       Appearance: Normal appearance.   HENT:      Head: Normocephalic and atraumatic.      Mouth/Throat:      Mouth: Mucous membranes are moist.   Eyes:      Comments: Non-icteric   Cardiovascular:      Rate and Rhythm: Normal rate and regular rhythm.      Pulses: Normal pulses.      Heart sounds: Normal heart sounds, S1 normal and S2 normal. Heart sounds not distant. No murmur heard.     No friction rub. No gallop.   Pulmonary:      Effort: Pulmonary effort is normal.      Breath sounds: Normal breath sounds.   Abdominal:      General: Abdomen is flat. Bowel sounds are normal.      Palpations: Abdomen is soft.   Musculoskeletal:      Cervical back: Neck supple.      Right lower leg: No edema.      Left lower leg: No edema.   Skin:     General: Skin is warm and dry.      Capillary Refill: Capillary refill takes less than 2 seconds.   Neurological:      General: No focal deficit present.      Mental Status: She is alert and oriented to person, place, and time.    Psychiatric:         Mood and Affect: Mood normal.         Behavior: Behavior normal.         Thought Content: Thought content normal.         Judgment: Judgment normal.            EKG - Reviewed and interpreted by me appears normal, NSR, normal axis, normal intervals, no acute ST/T changes c/w ischemia, no LVH by voltage criteria, there are no prior tracings available

## 2023-04-19 NOTE — PATIENT INSTRUCTIONS
Please contact the cardiac testing department at [phone] to schedule heart monitor.  Labs today, I will let you know what they show.  Can take over the counter pepcid for heartburn symptoms as needed or twice a day if things are worsening.

## 2023-04-19 NOTE — TELEPHONE ENCOUNTER
Patient is calling and is having heart symptoms off and on.  Patient states that her heart beat is pounding and patient is having anxiety.  Patient checked her pulse and her pulse is skipping beats. Patient feels symptoms more when not exercising.  Patient states that she will go to urgent care if no openings in clinic.         Reason for Disposition    Skipped or extra beat(s) and occurs 4 or more times per minute    Additional Information    Negative: Passed out (i.e., lost consciousness, collapsed and was not responding)    Negative: Shock suspected (e.g., cold/pale/clammy skin, too weak to stand, low BP, rapid pulse)    Negative: Difficult to awaken or acting confused (e.g., disoriented, slurred speech)    Negative: Visible sweat on face or sweat dripping down face    Negative: Unable to walk, or can only walk with assistance (e.g., requires support)    Negative: Received SHOCK from implantable cardiac defibrillator and has persisting symptoms (i.e., palpitations, lightheadedness)    Negative: Dizziness, lightheadedness, or weakness and heart beating very rapidly (e.g., > 140 / minute)    Negative: Dizziness, lightheadedness, or weakness and heart beating very slowly (e.g., < 50 / minute)    Negative: Sounds like a life-threatening emergency to the triager    Negative: Difficulty breathing    Negative: Dizziness, lightheadedness, or weakness    Negative: Heart beating very rapidly (e.g., > 140 / minute) and present now  (Exception: During exercise.)    Negative: Heart beating very slowly (e.g., < 50 / minute)  (Exception: Athlete and heart rate normal for caller.)    Negative: New or worsened shortness of breath with activity (dyspnea on exertion)    Negative: Patient sounds very sick or weak to the triager    Negative: Wearing a 'Holter monitor' or 'cardiac event monitor'    Negative: Received SHOCK from implantable cardiac defibrillator (and now feels well)    Negative: Heart beating very rapidly (e.g., > 140  / minute) and not present now  (Exception: During exercise.)    Negative: Skipped or extra beat(s) and increases with exercise or exertion    Protocols used: HEART RATE AND HEARTBEAT [id number]

## 2023-06-03 ENCOUNTER — HEALTH MAINTENANCE LETTER (OUTPATIENT)
Age: 48
End: 2023-06-03

## 2023-10-07 ENCOUNTER — HEALTH MAINTENANCE LETTER (OUTPATIENT)
Age: 48
End: 2023-10-07

## 2023-12-28 ENCOUNTER — HOSPITAL ENCOUNTER (EMERGENCY)
Facility: CLINIC | Age: 48
Discharge: HOME | End: 2023-12-29
Attending: EMERGENCY MEDICINE | Admitting: EMERGENCY MEDICINE
Payer: COMMERCIAL

## 2023-12-28 DIAGNOSIS — R07.9 ACUTE CHEST PAIN: ICD-10-CM

## 2023-12-28 DIAGNOSIS — R00.2 PALPITATIONS: ICD-10-CM

## 2023-12-28 LAB
BASOPHILS # BLD AUTO: 0.1 10E3/UL (ref 0–0.2)
BASOPHILS NFR BLD AUTO: 1 %
EOSINOPHIL # BLD AUTO: 0.3 10E3/UL (ref 0–0.7)
EOSINOPHIL NFR BLD AUTO: 4 %
ERYTHROCYTE [DISTWIDTH] IN BLOOD BY AUTOMATED COUNT: 14.5 % (ref 10–15)
HCT VFR BLD AUTO: 34 % (ref 35–47)
HGB BLD-MCNC: 10.7 G/DL (ref 11.7–15.7)
IMM GRANULOCYTES # BLD: 0 10E3/UL
IMM GRANULOCYTES NFR BLD: 0 %
LYMPHOCYTES # BLD AUTO: 2 10E3/UL (ref 0.8–5.3)
LYMPHOCYTES NFR BLD AUTO: 27 %
MCH RBC QN AUTO: 28 PG (ref 26.5–33)
MCHC RBC AUTO-ENTMCNC: 31.5 G/DL (ref 31.5–36.5)
MCV RBC AUTO: 89 FL (ref 78–100)
MONOCYTES # BLD AUTO: 0.6 10E3/UL (ref 0–1.3)
MONOCYTES NFR BLD AUTO: 9 %
NEUTROPHILS # BLD AUTO: 4.3 10E3/UL (ref 1.6–8.3)
NEUTROPHILS NFR BLD AUTO: 59 %
NRBC # BLD AUTO: 0 10E3/UL
NRBC BLD AUTO-RTO: 0 /100
PLATELET # BLD AUTO: 250 10E3/UL (ref 150–450)
RBC # BLD AUTO: 3.82 10E6/UL (ref 3.8–5.2)
WBC # BLD AUTO: 7.3 10E3/UL (ref 4–11)

## 2023-12-28 PROCEDURE — 36415 COLL VENOUS BLD VENIPUNCTURE: CPT | Performed by: EMERGENCY MEDICINE

## 2023-12-28 PROCEDURE — 85025 COMPLETE CBC W/AUTO DIFF WBC: CPT | Performed by: EMERGENCY MEDICINE

## 2023-12-28 PROCEDURE — 99284 EMERGENCY DEPT VISIT MOD MDM: CPT | Mod: 25 | Performed by: EMERGENCY MEDICINE

## 2023-12-28 PROCEDURE — 80053 COMPREHEN METABOLIC PANEL: CPT | Performed by: EMERGENCY MEDICINE

## 2023-12-28 PROCEDURE — 83735 ASSAY OF MAGNESIUM: CPT | Performed by: EMERGENCY MEDICINE

## 2023-12-28 PROCEDURE — 84443 ASSAY THYROID STIM HORMONE: CPT | Performed by: EMERGENCY MEDICINE

## 2023-12-28 PROCEDURE — 93005 ELECTROCARDIOGRAM TRACING: CPT | Performed by: EMERGENCY MEDICINE

## 2023-12-28 PROCEDURE — 84484 ASSAY OF TROPONIN QUANT: CPT | Performed by: EMERGENCY MEDICINE

## 2023-12-28 PROCEDURE — 93010 ELECTROCARDIOGRAM REPORT: CPT | Performed by: EMERGENCY MEDICINE

## 2023-12-28 PROCEDURE — 99284 EMERGENCY DEPT VISIT MOD MDM: CPT | Performed by: EMERGENCY MEDICINE

## 2023-12-28 ASSESSMENT — ACTIVITIES OF DAILY LIVING (ADL): ADLS_ACUITY_SCORE: 33

## 2023-12-29 VITALS
BODY MASS INDEX: 24.16 KG/M2 | TEMPERATURE: 98.9 F | WEIGHT: 145 LBS | SYSTOLIC BLOOD PRESSURE: 122 MMHG | HEART RATE: 69 BPM | HEIGHT: 65 IN | DIASTOLIC BLOOD PRESSURE: 92 MMHG | RESPIRATION RATE: 8 BRPM | OXYGEN SATURATION: 98 %

## 2023-12-29 LAB
ALBUMIN SERPL BCG-MCNC: 3.8 G/DL (ref 3.5–5.2)
ALP SERPL-CCNC: 39 U/L (ref 40–150)
ALT SERPL W P-5'-P-CCNC: 22 U/L (ref 0–50)
ANION GAP SERPL CALCULATED.3IONS-SCNC: 9 MMOL/L (ref 7–15)
AST SERPL W P-5'-P-CCNC: 19 U/L (ref 0–45)
BILIRUB SERPL-MCNC: <0.2 MG/DL
BUN SERPL-MCNC: 16.9 MG/DL (ref 6–20)
CALCIUM SERPL-MCNC: 9.2 MG/DL (ref 8.6–10)
CHLORIDE SERPL-SCNC: 106 MMOL/L (ref 98–107)
CREAT SERPL-MCNC: 0.5 MG/DL (ref 0.51–0.95)
DEPRECATED HCO3 PLAS-SCNC: 23 MMOL/L (ref 22–29)
EGFRCR SERPLBLD CKD-EPI 2021: >90 ML/MIN/1.73M2
GLUCOSE SERPL-MCNC: 90 MG/DL (ref 70–99)
MAGNESIUM SERPL-MCNC: 2 MG/DL (ref 1.7–2.3)
POTASSIUM SERPL-SCNC: 4.1 MMOL/L (ref 3.4–5.3)
PROT SERPL-MCNC: 6.3 G/DL (ref 6.4–8.3)
SODIUM SERPL-SCNC: 138 MMOL/L (ref 135–145)
TROPONIN T SERPL HS-MCNC: 13 NG/L
TSH SERPL DL<=0.005 MIU/L-ACNC: 1.3 UIU/ML (ref 0.3–4.2)

## 2023-12-29 NOTE — ED PROVIDER NOTES
0445 Pt repeatedly bed exiting throughout night but previously redirectable and cooperative. Now hitting and shoving staff, screaming, extremely combative, attempting to leave the room. Unable to redirect or console. A/Ox2, confused. Roll belt placed. Dr. Acosta notified. Order received for restraint. Daughter Blossom notified. Will continue to monitor.        "  History     Chief Complaint   Patient presents with    Chest Pain     Pt reports last night developed left sided intermittent chest pain that woke her from sleep. Pt reports she has had chest pain throughout the day. Pt denies chest pain at this time      HPI  History per patient, review of [facility] EMR and [facility] EMR, including extensive chart review of multiple prior imaging studies, multiple clinic notes and multiple prior laboratory evaluations.     Darlyn Parson is a 48 year old female with a history of palpitations and unremarkable 10-day cardiac monitor study this past spring who presents with her  for evaluation of chest pain and palpitations.  Onset of chest pain while in bed at ~ 2:00 AM this morning, lasting ~ 5-10 minutes and spontaneously resolving and then reoccurring ~ every 30-60 min during the night.  She also has had intermittent left upper arm pains today and this evening a brief episode of rapid fluttering palpitations making her feel like her \"heart was going to jump out of my\" chest.  No syncope, she has never had syncope with previous episodes of palpitations.  Her last episode of chest pain was at approximately 4 PM this afternoon.  No prior history of exertional chest pain.  No associated shortness of breath, nausea or sweating/diaphoresis.  Previous symptomatic palpitations have been associated with single PVCs.  She is particularly concerned about her symptoms because her father has/had an unclear cardiac history, including \"a hole in his heart\" and CAD, but she has no other cardiac risk factors.  She has had no URI signs or symptoms, cough, hemoptysis or leg pain or leg swelling.    Previous Records Reviewed:  4/25-5/05/23 Zio patch cardiac monitor study X 10 days:  Unremarkable, 2 SVT runs longest was 7 beats otherwise rare ectopics and symptoms correlated with single PVCs    Allergies:  No Known Allergies    Problem List:    Patient Active Problem List    Diagnosis " "Date Noted    Common Migraine (Without Aura)      Priority: Medium     Created by Conversion  Replacement Utility updated for latest IMO load            Past Medical History:    Past Medical History:   Diagnosis Date    Herpes zoster without mention of complication        Past Surgical History:    Past Surgical History:   Procedure Laterality Date    ZZC  DELIVERY ONLY      Description:  Section;  Recorded: 2014;  Comments: x3    ZZC LIGATE FALLOPIAN TUBE      Description: Tubal Ligation;  Recorded: 2014;       Family History:    History reviewed. No pertinent family history.    Social History:  Marital Status:   [2]  Social History     Tobacco Use    Smoking status: Never    Smokeless tobacco: Never   Substance Use Topics    Alcohol use: Never    Drug use: Never        Medications:    SUMAtriptan (IMITREX) 100 MG tablet        Review of Systems  As mentioned in the HPI, in addition focused review of systems was negative.    Physical Exam   BP: (!) 154/95  Pulse: 109  Temp: 98.9  F (37.2  C)  Resp: 18  Height: 165.1 cm (5' 5\")  Weight: 65.8 kg (145 lb)  SpO2: 99 %      Physical Exam  Vitals and nursing note reviewed.   Constitutional:       General: She is not in acute distress.     Appearance: Normal appearance. She is well-developed. She is not ill-appearing or diaphoretic.   HENT:      Head: Normocephalic and atraumatic.      Mouth/Throat:      Mouth: Mucous membranes are moist.   Eyes:      General: No scleral icterus.     Extraocular Movements: Extraocular movements intact.      Conjunctiva/sclera: Conjunctivae normal.   Neck:      Trachea: No tracheal deviation.   Cardiovascular:      Rate and Rhythm: Normal rate and regular rhythm.      Pulses: Normal pulses.      Heart sounds: Normal heart sounds. No murmur heard.     No friction rub. No gallop.   Pulmonary:      Effort: Pulmonary effort is normal. No respiratory distress.      Breath sounds: Normal breath sounds. No " wheezing, rhonchi or rales.   Abdominal:      Palpations: Abdomen is soft.   Musculoskeletal:         General: No swelling or tenderness. Normal range of motion.      Cervical back: Normal range of motion and neck supple.      Right lower leg: No edema.      Left lower leg: No edema.   Skin:     General: Skin is warm and dry.      Coloration: Skin is not pale.      Findings: No erythema or rash.   Neurological:      Mental Status: She is alert.   Psychiatric:         Behavior: Behavior normal.      Comments: Anxious affect.         ED Course           Procedures              EKG Interpretation:      Interpreted by Popeye Morgan MD  Time reviewed: upon completion  Symptoms at time of EKG: chest pain and left arm pain   Rhythm: normal sinus   Rate: normal  Axis: normal  Ectopy: none  Conduction: normal  ST Segments/ T Waves: No ST-T wave changes  Q Waves: none  Comparison to prior: 4/19/2023  Clinical Impression: normal EKG         Results for orders placed or performed during the hospital encounter of 12/28/23 (from the past 24 hour(s))   CBC with platelets differential    Narrative    The following orders were created for panel order CBC with platelets differential.  Procedure                               Abnormality         Status                     ---------                               -----------         ------                     CBC with platelets and d...[[id number]]  Abnormal            Final result                 Please view results for these tests on the individual orders.   Comprehensive metabolic panel   Result Value Ref Range    Sodium 138 135 - 145 mmol/L    Potassium 4.1 3.4 - 5.3 mmol/L    Carbon Dioxide (CO2) 23 22 - 29 mmol/L    Anion Gap 9 7 - 15 mmol/L    Urea Nitrogen 16.9 6.0 - 20.0 mg/dL    Creatinine 0.50 (L) 0.51 - 0.95 mg/dL    GFR Estimate >90 >60 mL/min/1.73m2    Calcium 9.2 8.6 - 10.0 mg/dL    Chloride 106 98 - 107 mmol/L    Glucose 90 70 - 99 mg/dL    Alkaline Phosphatase 39 (L)  40 - 150 U/L    AST 19 0 - 45 U/L    ALT 22 0 - 50 U/L    Protein Total 6.3 (L) 6.4 - 8.3 g/dL    Albumin 3.8 3.5 - 5.2 g/dL    Bilirubin Total <0.2 <=1.2 mg/dL   Troponin T, High Sensitivity   Result Value Ref Range    Troponin T, High Sensitivity 13 <=14 ng/L   Magnesium   Result Value Ref Range    Magnesium 2.0 1.7 - 2.3 mg/dL   TSH with free T4 reflex   Result Value Ref Range    TSH 1.30 0.30 - 4.20 uIU/mL   CBC with platelets and differential   Result Value Ref Range    WBC Count 7.3 4.0 - 11.0 10e3/uL    RBC Count 3.82 3.80 - 5.20 10e6/uL    Hemoglobin 10.7 (L) 11.7 - 15.7 g/dL    Hematocrit 34.0 (L) 35.0 - 47.0 %    MCV 89 78 - 100 fL    MCH 28.0 26.5 - 33.0 pg    MCHC 31.5 31.5 - 36.5 g/dL    RDW 14.5 10.0 - 15.0 %    Platelet Count 250 150 - 450 10e3/uL    % Neutrophils 59 %    % Lymphocytes 27 %    % Monocytes 9 %    % Eosinophils 4 %    % Basophils 1 %    % Immature Granulocytes 0 %    NRBCs per 100 WBC 0 <1 /100    Absolute Neutrophils 4.3 1.6 - 8.3 10e3/uL    Absolute Lymphocytes 2.0 0.8 - 5.3 10e3/uL    Absolute Monocytes 0.6 0.0 - 1.3 10e3/uL    Absolute Eosinophils 0.3 0.0 - 0.7 10e3/uL    Absolute Basophils 0.1 0.0 - 0.2 10e3/uL    Absolute Immature Granulocytes 0.0 <=0.4 10e3/uL    Absolute NRBCs 0.0 10e3/uL       Medications - No data to display    Assessments & Plan (with Medical Decision Making)   48-year-old female with atypical chest pain which is doubtful to be cardiac in etiology, and palpitations without syncope.  Previous episodic palpitations have been evaluated with a 10-day Zio patch cardiac monitor study and her symptoms were associated with single PVCs, the study was otherwise unremarkable.  EKG and laboratory evaluations unremarkable and I doubt ACS/atypical ACS, aneurysm/dissection, pericarditis, PE/DVT, PTX, pneumonia or emergent GI or hepatobiliary disease process as a cause of the pain.  Palpitations appear to be of benign nature, without syncope, and possibly due to recurrent  PVCs.  I doubt ventricular dysrhythmia, malignant dysrhythmia or conduction abnormality, or other emergent cause of her palpitations.  I will order an outpatient Echocardiogram and make a primary care referral for her to establish primary care and for follow-up and further evaluation of her symptoms.  She and  were provided instructions for supportive care and will return as needed for worsened condition or worsening symptoms, or new problems or concerns.      I have reviewed the nursing notes.    I have reviewed the findings, diagnosis, plan and need for follow up with the patient and her .    Medical Decision Making: Moderate complexity  Hospitalization for observation,  cardiac monitoring and serial troponins was considered and deferred. Currently appears stable and appropriate for outpatient management with close f/u.      New Prescriptions    No medications on file       Final diagnoses:   Acute chest pain   Palpitations       12/28/2023   [facility] EMERGENCY DEPT       Popeye Morgan MD  12/29/23 [id number]

## 2023-12-29 NOTE — ED TRIAGE NOTES
Pt reports last night developed left sided intermittent chest pain that woke her from sleep. Pt reports she has had chest pain throughout the day. Pt denies chest pain at this time      Triage Assessment (Adult)       Row Name 12/28/23 4093          Cardiac WDL    Cardiac WDL X;chest pain        Chest Pain Assessment    Chest Pain Location anterior chest, left

## 2024-02-06 ENCOUNTER — TELEPHONE (OUTPATIENT)
Dept: FAMILY MEDICINE | Facility: CLINIC | Age: 49
End: 2024-02-06
Payer: COMMERCIAL

## 2024-02-06 NOTE — TELEPHONE ENCOUNTER
Patient Quality Outreach    Patient is due for the following:   Colon Cancer Screening  Breast Cancer Screening - Mammogram  Cervical Cancer Screening - PAP Needed  Physical Preventive Adult Physical    Next Steps:   Schedule a Adult Preventative    Type of outreach:    Sent letter.    Next Steps:  Reach out within 90 days via Letter.    Max number of attempts reached: No. Will try again in 90 days if patient still on fail list.    Questions for provider review:    None           Christel Joy, [facility]

## 2024-02-06 NOTE — LETTER
February 6, 2024    To  Darlyn Parson  [address] [address] [address]    Your team at [facility] cares about your health. We have reviewed your chart and based on our findings; we are making the following recommendations to better manage your health.     You are in particular need of attention regarding the following:     Schedule Annual MAMMOGRAPHY. [facility] scheduling number is [phone] or schedule in MyChart (self referral).  1 in 8 women will develop invasive breast cancer during her lifetime and it is the most common non-skin cancer in American Women. EARLY detection, new treatments, and a better understanding of the disease have increased survival rates- the 5 year survival rate in the 1960's was 63% and today it is close to 90%.  Schedule a primary care office visit with your provider for a Pap Smear to screen for Cervical Cancer.  Call or MyChart message your clinic to schedule a colonoscopy, schedule/ a FIT Test, or order a Cologuard test. If you are unsure what type of test you need, please call your clinic and speak to clinic staff.   Colon cancer is now the second leading cause of cancer-related deaths in the [place] for both men and women and there are over 130,000 new cases and 50,000 deaths per year from colon cancer. Colonoscopies can prevent 90-95% of these deaths. Problem lesions can be removed before they ever become cancer. This test is not only looking for cancer, but also getting rid of precancerous lesions.   PREVENTATIVE VISIT: Physical    If you have already completed these items, please contact the clinic via phone or   [facility] so your care team can review and update your records. Thank you for   choosing [facility] for your healthcare needs. For any questions,   concerns, or to schedule an appointment please contact our clinic.    Healthy Regards,      Your [facility] Care Team

## 2024-08-15 ENCOUNTER — VIRTUAL VISIT (OUTPATIENT)
Dept: FAMILY MEDICINE | Facility: CLINIC | Age: 49
End: 2024-08-15
Payer: COMMERCIAL

## 2024-08-15 DIAGNOSIS — Z82.0 FAMILY HISTORY OF MYOTONIC DYSTROPHY: ICD-10-CM

## 2024-08-15 DIAGNOSIS — G43.009 MIGRAINE WITHOUT AURA AND WITHOUT STATUS MIGRAINOSUS, NOT INTRACTABLE: Primary | ICD-10-CM

## 2024-08-15 PROCEDURE — 99213 OFFICE O/P EST LOW 20 MIN: CPT | Mod: 95 | Performed by: NURSE PRACTITIONER

## 2024-08-15 RX ORDER — SUMATRIPTAN 100 MG/1
100 TABLET, FILM COATED ORAL
Qty: 30 TABLET | Refills: 11 | Status: SHIPPED | OUTPATIENT
Start: 2024-08-15

## 2024-08-15 NOTE — PROGRESS NOTES
Darlyn is a 49 year old who is being evaluated via a billable video visit.    How would you like to obtain your AVS? MyChart  If the video visit is dropped, the invitation should be resent by: Text to cell phone: [phone]  Will anyone else be joining your video visit? No      Assessment & Plan     Migraine without aura and without status migrainosus, not intractable    - SUMAtriptan (IMITREX) 100 MG tablet; Take 1 tablet (100 mg) by mouth every 2 hours as needed for migraine Max 2 per day    Family history of myotonic dystrophy    - Adult Genetics & Metabolism  Referral; Future    Will consider Propranolol as a preventative for migraine and treatment for palpitations.       CONSULTATION/REFERRAL to Genetics    FUTURE APPOINTMENTS:       - Make appointment with genetics specialist       - Follow-up for annual visit or as needed    Subjective   Darlyn is a 49 year old, presenting for the following health issues:  Recheck Medication      8/15/2024     2:33 PM   Additional Questions   Roomed by Mamie GUTIÉRREZ     Medication Followup of sumatriptin   Taking Medication as prescribed: yes  Side Effects:  None  Medication Helping Symptoms:  yes    Migraine   Since your last clinic visit, how have your headaches changed?  No change  How often are you getting headaches or migraines? 1-2 at beginning of cycle and 1-2 at end of period.    Are you able to do normal daily activities when you have a migraine? Yes  Are you taking rescue/relief medications? (Select all that apply) sumatriptan (Imitrex)  How helpful is your rescue/relief medication?  I get some relief  Are you taking any medications to prevent migraines? (Select all that apply)  No  In the past 4 weeks, how often have you gone to urgent care or the emergency room because of your headaches?  0    Hx of early onset cataracts. Grandmother and Father and multiple aunts and uncles with Myotonic Dystrophy which can cause cataracts. Has never had genetic  testing.       Review of Systems  Constitutional, HEENT, cardiovascular, pulmonary, gi and gu systems are negative, except as otherwise noted.      Objective           Vitals:  No vitals were obtained today due to virtual visit.    Physical Exam   GENERAL: alert and no distress  EYES: Eyes grossly normal to inspection.  No discharge or erythema, or obvious scleral/conjunctival abnormalities.  RESP: No audible wheeze, cough, or visible cyanosis.    SKIN: Visible skin clear. No significant rash, abnormal pigmentation or lesions.  NEURO: Cranial nerves grossly intact.  Mentation and speech appropriate for age.  PSYCH: Appropriate affect, tone, and pace of words          Video-Visit Details    Type of service:  Video Visit   Originating Location (pt. Location): Home    Distant Location (provider location):  On-site  Platform used for Video Visit: Bony  Signed Electronically by: LB Back CNP

## 2024-08-20 ENCOUNTER — TELEPHONE (OUTPATIENT)
Dept: CONSULT | Facility: CLINIC | Age: 49
End: 2024-08-20
Payer: COMMERCIAL

## 2024-08-20 NOTE — TELEPHONE ENCOUNTER
LVM for patient to call me back directly to schedule GC only visit with Jada/Gloria/Maria Del Rosario in Neuro slot.

## 2024-09-20 ENCOUNTER — TELEPHONE (OUTPATIENT)
Dept: FAMILY MEDICINE | Facility: CLINIC | Age: 49
End: 2024-09-20
Payer: COMMERCIAL

## 2024-09-20 NOTE — TELEPHONE ENCOUNTER
Patient Quality Outreach    Patient is due for the following:   Colon Cancer Screening  Breast Cancer Screening - Mammogram  Cervical Cancer Screening - PAP Needed  Physical Preventive Adult Physical    Next Steps:   Schedule a Adult Preventative    Type of outreach:    Sent letter.    Next Steps:  Reach out within 90 days via Letter.    Max number of attempts reached: No. Will try again in 90 days if patient still on fail list.    Questions for provider review:    None           Christel Joy, [facility]

## 2024-09-20 NOTE — LETTER
September 20, 2024    To  Darlyn Parson  [address] [address] [address]    Your team at [facility] cares about your health. We have reviewed your chart and based on our findings; we are making the following recommendations to better manage your health.     You are in particular need of attention regarding the following:     Call or MyChart message your clinic to schedule a colonoscopy, schedule/ a FIT Test, or order a Cologuard test. If you are unsure what type of test you need, please call your clinic and speak to clinic staff.   Colon cancer is now the second leading cause of cancer-related deaths in the [place] for both men and women and there are over 130,000 new cases and 50,000 deaths per year from colon cancer. Colonoscopies can prevent 90-95% of these deaths. Problem lesions can be removed before they ever become cancer. This test is not only looking for cancer, but also getting rid of precancerous lesions.   Schedule Annual MAMMOGRAPHY. [facility] scheduling number is [phone] or schedule in MyChart (self referral).  1 in 8 women will develop invasive breast cancer during her lifetime and it is the most common non-skin cancer in American Women. EARLY detection, new treatments, and a better understanding of the disease have increased survival rates- the 5 year survival rate in the 1960's was 63% and today it is close to 90%.  Schedule a primary care office visit with your provider for a Pap Smear to screen for Cervical Cancer.  PREVENTATIVE VISIT: Physical    If you have already completed these items, please contact the clinic via phone or   [facility] so your care team can review and update your records. Thank you for   choosing [facility] for your healthcare needs. For any questions,   concerns, or to schedule an appointment please contact our clinic.    Healthy Regards,      Your [facility] Care Team

## 2024-11-26 ENCOUNTER — TELEPHONE (OUTPATIENT)
Dept: FAMILY MEDICINE | Facility: CLINIC | Age: 49
End: 2024-11-26
Payer: COMMERCIAL

## 2024-11-26 NOTE — LETTER
November 26, 2024    To  Darlyn Parson  [address] [address] [address]    Your team at [facility] cares about your health. We have reviewed your chart and based on our findings; we are making the following recommendations to better manage your health.     You are in particular need of attention regarding the following:     Call or MyChart message your clinic to schedule a colonoscopy, schedule/ a FIT Test, or order a Cologuard test. If you are unsure what type of test you need, please call your clinic and speak to clinic staff.   Colon cancer is now the second leading cause of cancer-related deaths in the [place] for both men and women and there are over 130,000 new cases and 50,000 deaths per year from colon cancer. Colonoscopies can prevent 90-95% of these deaths. Problem lesions can be removed before they ever become cancer. This test is not only looking for cancer, but also getting rid of precancerous lesions.   Schedule Annual MAMMOGRAPHY. [facility] scheduling number is [phone] or schedule in MyChart (self referral).  1 in 8 women will develop invasive breast cancer during her lifetime and it is the most common non-skin cancer in [place] Women. EARLY detection, new treatments, and a better understanding of the disease have increased survival rates- the 5 year survival rate in the 1960's was 63% and today it is close to 90%.  Schedule a primary care office visit with your provider for a Pap Smear to screen for Cervical Cancer.  PREVENTATIVE VISIT: Physical    If you have already completed these items, please contact the clinic via phone or   [facility] so your care team can review and update your records. Thank you for   choosing [facility] for your healthcare needs. For any questions,   concerns, or to schedule an appointment please contact our clinic.    Healthy Regards,      Your [facility] Care Team

## 2024-11-26 NOTE — TELEPHONE ENCOUNTER
Patient Quality Outreach    Patient is due for the following:   Colon Cancer Screening  Breast Cancer Screening - Mammogram  Cervical Cancer Screening - PAP Needed  Physical Preventive Adult Physical    Action(s) Taken:   Schedule a Adult Preventative    Type of outreach:    Sent letter.    Questions for provider review:    None           Christel Joy, [facility]

## 2024-11-30 ENCOUNTER — HEALTH MAINTENANCE LETTER (OUTPATIENT)
Age: 49
End: 2024-11-30

## 2025-02-25 ENCOUNTER — TELEPHONE (OUTPATIENT)
Dept: FAMILY MEDICINE | Facility: CLINIC | Age: 50
End: 2025-02-25
Payer: COMMERCIAL

## 2025-02-25 NOTE — TELEPHONE ENCOUNTER
Patient Quality Outreach    Patient is due for the following:   Colon Cancer Screening  Breast Cancer Screening - Mammogram  Cervical Cancer Screening - PAP Needed  Physical Preventive Adult Physical    Action(s) Taken:   Schedule a Adult Preventative    Type of outreach:    Sent letter.    Questions for provider review:    None           Christel Joy, [facility]

## 2025-02-25 NOTE — LETTER
February 25, 2025    To  Darlyn Parson  [address] [address] [address]    Your team at [facility] cares about your health. We have reviewed your chart and based on our findings; we are making the following recommendations to better manage your health.     You are in particular need of attention regarding the following:     Call or MyChart message your clinic to schedule a colonoscopy, schedule/ a FIT Test, or order a Cologuard test. If you are unsure what type of test you need, please call your clinic and speak to clinic staff.   Colon cancer is now the second leading cause of cancer-related deaths in the [place] for both men and women and there are over 130,000 new cases and 50,000 deaths per year from colon cancer. Colonoscopies can prevent 90-95% of these deaths. Problem lesions can be removed before they ever become cancer. This test is not only looking for cancer, but also getting rid of precancerous lesions.   Schedule Annual MAMMOGRAPHY. [facility] scheduling number is [phone] or schedule in MyChart (self referral).  1 in 8 women will develop invasive breast cancer during her lifetime and it is the most common non-skin cancer in American Women. EARLY detection, new treatments, and a better understanding of the disease have increased survival rates- the 5 year survival rate in the 1960's was 63% and today it is close to 90%.  Schedule a primary care office visit with your provider for a Pap Smear to screen for Cervical Cancer.  PREVENTATIVE VISIT: Physical    If you have already completed these items, please contact the clinic via phone or   [employer] so your care team can review and update your records. Thank you for   choosing [facility] for your healthcare needs. For any questions,   concerns, or to schedule an appointment please contact our clinic.    Healthy Regards,      Your [facility] Care Team            Electronically signed

## 2025-06-07 ENCOUNTER — HEALTH MAINTENANCE LETTER (OUTPATIENT)
Age: 50
End: 2025-06-07